# Patient Record
Sex: FEMALE | Race: WHITE | URBAN - METROPOLITAN AREA
[De-identification: names, ages, dates, MRNs, and addresses within clinical notes are randomized per-mention and may not be internally consistent; named-entity substitution may affect disease eponyms.]

---

## 2017-03-06 ENCOUNTER — TRANSFERRED RECORDS (OUTPATIENT)
Dept: HEALTH INFORMATION MANAGEMENT | Facility: CLINIC | Age: 52
End: 2017-03-06

## 2017-06-03 ENCOUNTER — NURSE TRIAGE (OUTPATIENT)
Dept: NURSING | Facility: CLINIC | Age: 52
End: 2017-06-03

## 2017-06-03 ENCOUNTER — TELEPHONE (OUTPATIENT)
Dept: DERMATOLOGY | Facility: CLINIC | Age: 52
End: 2017-06-03

## 2017-06-03 DIAGNOSIS — L40.9 PSORIASIS: Primary | ICD-10-CM

## 2017-06-03 RX ORDER — DESONIDE 0.5 MG/G
OINTMENT TOPICAL
Qty: 15 G | Refills: 0 | Status: SHIPPED | OUTPATIENT
Start: 2017-06-03 | End: 2017-06-03

## 2017-06-03 RX ORDER — DESONIDE 0.5 MG/G
OINTMENT TOPICAL
Qty: 15 G | Refills: 0 | Status: SHIPPED | OUTPATIENT
Start: 2017-06-03 | End: 2018-04-03

## 2017-06-03 NOTE — TELEPHONE ENCOUNTER
Regarding: Med refill.  ----- Message from Raghu Madden sent at 6/3/2017 11:39 AM CDT -----  Reason for Call:  Medication or medication refill:    Do you use a [facility]?  Name of the pharmacy and phone number for the current request:     Name of the medication requested: Unspecified.    Other request:     Can we leave a detailed message on this number? YES    Phone number patient can be reached at: Home number on file [phone] (home)    Best Time: Any time    Call taken on 6/3/2017 at 11:38 AM by Raghu Madden

## 2017-06-03 NOTE — TELEPHONE ENCOUNTER
"Patient calling regarding medication request. States when she was last in to see Dr. Latham, she received hydrocortisone cream for psoriasis flare ups of eyelids.  Patient states this is not what she has had in the past from previous Dr. Patient does not know name of previous medication. Has tried the RX for hydrocortisone cream  For several days and no relief. Paged Dr. Nasreen Zavala for [facility] through page  at 12:15pm directly to patient at [phone].  Mirella Gooden RN   [facility]    Reason for Disposition    Caller has URGENT medication question about med that PCP prescribed and triager unable to answer question    Additional Information    Negative: Drug overdose and nurse unable to answer question    Negative: Caller requesting information not related to medicine    Negative: Caller requesting a prescription for Strep throat and has a positive culture result    Negative: Rash while taking a medication or within 3 days of stopping it    Negative: Immunization reaction suspected    Negative: [1] Asthma and [2] having symptoms of asthma (cough, wheezing, etc)    Negative: MORE THAN A DOUBLE DOSE of a prescription or over-the-counter (OTC) drug    Negative: [1] DOUBLE DOSE (an extra dose or lesser amount) of over-the-counter (OTC) drug AND [2] any symptoms (e.g., dizziness, nausea, pain, sleepiness)    Negative: [1] DOUBLE DOSE (an extra dose or lesser amount) of prescription drug AND [2] any symptoms (e.g., dizziness, nausea, pain, sleepiness)    Negative: Took another person's prescription drug    Negative: [1] DOUBLE DOSE (an extra dose or lesser amount) of prescription drug AND [2] NO symptoms (Exception: a double dose of antibiotics)    Negative: Diabetes drug error or overdose (e.g., insulin or extra dose)    Negative: [1] Request for URGENT new prescription or refill of \"essential\" medication (i.e., likelihood of harm to patient if not taken) AND [2] triager unable to fill " per unit policy    Negative: [1] Prescription not at pharmacy AND [2] was prescribed today by PCP    Negative: Pharmacy calling with prescription questions and triager unable to answer question    Protocols used: MEDICATION QUESTION CALL-ADULT-

## 2017-06-03 NOTE — TELEPHONE ENCOUNTER
Oncall page that eyelids have been bad with psoriasis since Thursday (x 3 days).  Has been using HC 2.5% cream from Dr. Latham, however thinks that the cream is irritating and keeps rubbing eyes due to itch.  Chart review: saw Dr. Latham on 12/2016 for psoriasis, predominantly scalp. Has also seen Dr. Booth in past, had +TRUE test for ACD.  I will send in desonide ointment that she can use BID x 2 weeks on eyelids, provided 15g tube no refills.  Should see Dr. Latham in clinic if unresolved.  She understood plan.     Nasreen Zavala MD  Dermatology Resident, PGY4  [phone]

## 2017-10-29 ENCOUNTER — HEALTH MAINTENANCE LETTER (OUTPATIENT)
Age: 52
End: 2017-10-29

## 2018-04-03 ENCOUNTER — OFFICE VISIT (OUTPATIENT)
Dept: DERMATOLOGY | Facility: CLINIC | Age: 53
End: 2018-04-03
Payer: COMMERCIAL

## 2018-04-03 DIAGNOSIS — M25.50 ARTHRALGIA, UNSPECIFIED JOINT: Primary | ICD-10-CM

## 2018-04-03 DIAGNOSIS — L40.9 SCALP PSORIASIS: ICD-10-CM

## 2018-04-03 DIAGNOSIS — L72.0 MILIA: ICD-10-CM

## 2018-04-03 DIAGNOSIS — L81.4 SOLAR LENTIGINOSIS: ICD-10-CM

## 2018-04-03 DIAGNOSIS — L40.9 PSORIASIS: ICD-10-CM

## 2018-04-03 PROCEDURE — 99213 OFFICE O/P EST LOW 20 MIN: CPT | Mod: 25 | Performed by: DERMATOLOGY

## 2018-04-03 PROCEDURE — 11901 INJECT SKIN LESIONS >7: CPT | Performed by: DERMATOLOGY

## 2018-04-03 RX ORDER — KETOCONAZOLE 20 MG/ML
SHAMPOO TOPICAL
Qty: 100 ML | Refills: 5 | Status: SHIPPED | OUTPATIENT
Start: 2018-04-03 | End: 2019-04-22

## 2018-04-03 RX ORDER — HYDROCORTISONE 2.5 %
CREAM (GRAM) TOPICAL
Qty: 60 G | Refills: 1 | Status: SHIPPED | OUTPATIENT
Start: 2018-04-03

## 2018-04-03 RX ORDER — CLOBETASOL PROPIONATE 0.5 MG/ML
SOLUTION TOPICAL
Qty: 150 ML | Refills: 2 | Status: SHIPPED | OUTPATIENT
Start: 2018-04-03

## 2018-04-03 NOTE — LETTER
4/3/2018         RE: Patricia Hudson  [address] [address]        Dear Colleague,    Thank you for referring your patient, Patricia Hudson, to the [facility]. Please see a copy of my visit note below.    [facility] Dermatology Note      Dermatology Problem List:  1. Psoriasis, scalp  -Current Tx: Clobetasol solution (initiated 7/29/2013), ketoconazole shampoo, desonide for eyebrows  -Previous Tx: Clobex spray (initiated 7/29/2013), Excimer laser, last treatment 7/9/2015, Enbrel X1 month  -s/p ILK  2. Acne vulgaris  -tretinoin 0.05% cream (initiated 8/19/2015)  -Previous Tx: BP wash, Epiduo  3. History of contact dermatitis  -Patch testing with [facility]  --Balsam Peru (Myroxylon Pereirae) (1+)  --2-Bromo-2-nitropropane-1,3-diol (Bronopol) (1)  --Propolis (1+)  --Benzyl salicylate (1)    Encounter Date: Apr 3, 2018    CC:  Chief Complaint   Patient presents with     Skin Check     Hx of Psoriasis - Scalp was bad in the winter but is getting bett - Needs refill of Ketoconazole         History of Present Illness:  Ms. Patricia Hudson is a 52 year old female who presents as a follow up for psoriasis of the scalp. The patient was  last seen 12/2016 when they underwent ILK and was continued on topicals. The patient is using the clobetasol and the hydrocortisone The patient feels as though. This is keeping her disease stable. The pt reports her eyelids are doing well. She has occasional spots on the face that bother her.     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 The patient has 1-2 alcoholic drinks per week, does not smoke or use tobacco.    Family History:  There is a family history of SCC and BCC.  There is a family history of psoriasis.     Medications:  Current Outpatient Prescriptions   Medication Sig Dispense Refill     desonide (DESOWEN) 0.05 % ointment Use on the eyelids twice a day for psoriasis for a maximum of 2 weeks 15 g 0     ZOLMitriptan (ZOMIG) 5 MG tablet   3     nortriptyline (PAMELOR) 10 MG capsule        clobetasol (TEMOVATE) 0.05 % external solution Use once  daily for up to 2 weeks in a row on the scalp for flare 150 mL 2     hydrocortisone 2.5 % cream For eyelids, upply two times daily for up to 1 week in a row for flares. Take breaks from use 60 g 1     ketoconazole (NIZORAL) 2 % shampoo Use every other day in the shower for scalp 100 mL 5     amitriptyline (ELAVIL) 10 MG tablet Take 10 mg by mouth 2 times daily At bedtime       sertraline (ZOLOFT) 100 MG tablet daily       SUMAtriptan (IMITREX) 100 MG tablet Take 1 tablet (100 mg) by mouth at onset of headache 9 tablet 3       No Known Allergies    Review of Systems:  -Rheum: She has joint pain and sciatic nerve pain.Unsure if still for 45 minutes at a time or not. Has had several sport injuries and wants to see rheum  -Skin: As per HPI   -Constitutional: The patient is feeling generally well.     Physical exam:  Vitals: There were no vitals taken for this visit.  GEN: This is a well developed, well-nourished female in no acute distress, in a pleasant mood.    SKIN:  Total skin excluding the undergarment areas was performed. The exam included the head/face, neck, both arms, chest, back, abdomen, both legs, digits and/or nails. Inclduing exam of the buttocks   -Scattered brown macules on sun exposed areas.  -macular erythema, occipital scalp  -Onycholysis of the  nails.   -Macular erythema on the crown pf the scalp  -Ears and cheeks are normal   -No other lesions of concern on areas examined.       Impression/Plan:  1. Psoriasis, scalp. S/p Hx of Excimer laser and ILK. Well controlled with topicals      Continue clobetasol 0.05% solution. Apply once daily for up to two weeks in a row as needed for flare.     Continue ketoconazole 2% shampoo. Apply every other day in the shower to the scalp.     For eyelids, continue hydrocortisone 2.5% cream. Apply two times daily for up to 1 week in a row for flares. Instructed to take breaks from use.     Referral for rheum placed as patient has had joint pain    2. Onycholysis 2/2 psoriasis, she would like local treatment  Kenalog intralesional injection procedure note: After verbal consent and discussion of risks including but not limited to atrophy, pain, and bruising, time out was performed, the patient underwent positioning and the area was prepped with isopropyl alcohol, .7 total cc of Kenalog 5 mg/cc was injected into 10 sites on the nails.  The patient tolerated the procedure well and left the Dermatology clinic in good condition.        3. Millia    Start Differin cream for the face once daily.        Follow up in6 weeks, earlier for new or changing lesions.     Staff Involved:  Scribe/Staff     Scribe Disclosure:   I, Holli Silverman, am serving as a scribe to document services personally performed by Dr. Rosalee Latham, based on data collection and the provider's statements to me.       Provider Disclosure:   The documentation recorded by the scribe accurately reflects the services I personally performed and the decisions made by me.    Rosalee Latham MD    Department of Dermatology  [facility]: Phone: [phone], Fax:[phone]  [facility]: Phone: [phone], Fax: [phone]        Again, thank you  for allowing me to participate in the care of your patient.        Sincerely,        Rosalee Latham MD

## 2018-04-03 NOTE — PATIENT INSTRUCTIONS
Preventive Care:    Breast Cancer Screening: During our visit today, we discussed that it is recommended you receive breast cancer screening. Please call or make an appointment with your primary care provider to discuss this with them. You may also call the [facility] scheduling line ([phone]) to set up a mammography appointment at the Breast Center within the [facility].    Over the counter Differin for millia, may use once daily     See rheumatology for evaluation of psoriatic arthritis, referral placed

## 2018-04-03 NOTE — Clinical Note
Please abstract the following data from this visit with this patient into the appropriate field in Epic:  Colonoscopy done on this date: 3/6/17 (approximately), by this group: [facility], results were normal.

## 2018-04-03 NOTE — PROGRESS NOTES
[facility] Dermatology Note      Dermatology Problem List:  1. Psoriasis, scalp  -Current Tx: Clobetasol solution (initiated 7/29/2013), ketoconazole shampoo, desonide for eyebrows  -Previous Tx: Clobex spray (initiated 7/29/2013), Excimer laser, last treatment 7/9/2015, Enbrel X1 month  -s/p ILK  2. Acne vulgaris  -tretinoin 0.05% cream (initiated 8/19/2015)  -Previous Tx: BP wash, Epiduo  3. History of contact dermatitis  -[facility]  --Balsam Peru (Myroxylon Pereirae) (1+)  --2-Bromo-2-nitropropane-1,3-diol (Bronopol) (1)  --Propolis (1+)  --Benzyl salicylate (1)    Encounter Date: Apr 3, 2018    CC:  Chief Complaint   Patient presents with     Skin Check     Hx of Psoriasis - Scalp was bad in the winter but is getting bett - Needs refill of Ketoconazole         History of Present Illness:  Ms. Patricia Hudson is a 52 year old female who presents as a follow up for psoriasis of the scalp. The patient was  last seen 12/2016 when they underwent ILK and was continued on topicals. The patient is using the clobetasol and the hydrocortisone The patient feels as though. This is keeping her disease stable. The pt reports her eyelids are doing well. She has occasional spots on the face that bother her.     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 The patient has 1-2 alcoholic drinks per week, does not smoke or use tobacco.    Family History:  There is a  family history of SCC and BCC.  There is a family history of psoriasis.     Medications:  Current Outpatient Prescriptions   Medication Sig Dispense Refill     desonide (DESOWEN) 0.05 % ointment Use on the eyelids twice a day for psoriasis for a maximum of 2 weeks 15 g 0     ZOLMitriptan (ZOMIG) 5 MG tablet   3     nortriptyline (PAMELOR) 10 MG capsule        clobetasol (TEMOVATE) 0.05 % external solution Use once  daily for up to 2 weeks in a row on the scalp for flare 150 mL 2     hydrocortisone 2.5 % cream For eyelids, upply two times daily for up to 1 week in a row for flares. Take breaks from use 60 g 1     ketoconazole (NIZORAL) 2 % shampoo Use every other day in the shower for scalp 100 mL 5     amitriptyline (ELAVIL) 10 MG tablet Take 10 mg by mouth 2 times daily At bedtime       sertraline (ZOLOFT) 100 MG tablet daily       SUMAtriptan (IMITREX) 100 MG tablet Take 1 tablet (100 mg) by mouth at onset of headache 9 tablet 3       No Known Allergies    Review of Systems:  -Rheum: She has joint pain and sciatic nerve pain.Unsure if still for 45 minutes at a time or not. Has had several sport injuries and wants to see rheum  -Skin: As per HPI   -Constitutional: The patient is feeling generally well.     Physical exam:  Vitals: There were no vitals taken for this visit.  GEN: This is a well developed, well-nourished female in no acute distress, in a pleasant mood.    SKIN:  Total skin excluding the undergarment areas was performed. The exam included the head/face, neck, both arms, chest, back, abdomen, both legs, digits and/or nails. Inclduing exam of the buttocks   -Scattered brown macules on sun exposed areas.  -macular erythema, occipital scalp  -Onycholysis of the nails.   -Macular erythema on the crown pf the scalp  -Ears and cheeks are normal   -No other lesions of concern on areas examined.       Impression/Plan:  1. Psoriasis, scalp. S/p Hx of Excimer laser and ILK. Well controlled with topicals       Continue clobetasol 0.05% solution. Apply once daily for up to two weeks in a row as needed for flare.     Continue ketoconazole 2% shampoo. Apply every other day in the shower to the scalp.     For eyelids, continue hydrocortisone 2.5% cream. Apply two times daily for up to 1 week in a row for flares. Instructed to take breaks from use.     Referral for rheum placed as patient has had joint pain    2. Onycholysis 2/2 psoriasis, she would like local treatment  Kenalog intralesional injection procedure note: After verbal consent and discussion of risks including but not limited to atrophy, pain, and bruising, time out was performed, the patient underwent positioning and the area was prepped with isopropyl alcohol, .7 total cc of Kenalog 5 mg/cc was injected into 10 sites on the nails.  The patient tolerated the procedure well and left the Dermatology clinic in good condition.        3. Millia    Start Differin cream for the face once daily.        Follow up in6 weeks, earlier for new or changing lesions.     Staff Involved:  Scribe/Staff     Scribe Disclosure:   I, Holli Silverman, am serving as a scribe to document services personally performed by Dr. Rosalee Latham, based on data collection and the provider's statements to me.       Provider Disclosure:   The documentation recorded by the scribe accurately reflects the services I personally performed and the decisions made by me.    Rosalee Latham MD    Department of Dermatology  [facility]: Phone: [phone], Fax:[phone]  [facility]: Phone: [phone], Fax: [phone]

## 2018-04-03 NOTE — NURSING NOTE
Dermatology Rooming Note    Patricia Hudson's goals for this visit include:   Chief Complaint   Patient presents with     Skin Check     Hx of Psoriasis - Scalp was bad in the winter but is getting bett - Needs refill of Ketoconazole       Is a scribe okay for this visit: YES    Are records needed for this visit(If yes, obtain release of information): NO     Vitals: There were no vitals taken for this visit.    Referring Provider:  Referred Self, MD  No address on file      Debra Mathis CMA

## 2018-04-10 ENCOUNTER — TELEPHONE (OUTPATIENT)
Dept: DERMATOLOGY | Facility: CLINIC | Age: 53
End: 2018-04-10

## 2018-04-10 NOTE — TELEPHONE ENCOUNTER
Health Call Center    Phone Message    May a detailed message be left on voicemail: yes    Reason for Call: Patient called wanting to know the over the counter product Dr Latham recommended at her last visit. She stated it would be given to her and per patient she was never given the info. Requesting a call back today with that information. Thank you    Action Taken: Message routed to:  Adult Clinics: Dermatology p [id number]

## 2018-04-10 NOTE — TELEPHONE ENCOUNTER
Voicemail message left notifying patient that it is recommended she use OTC Differin once daily.    Anita Mcintyre RN

## 2018-05-22 ENCOUNTER — OFFICE VISIT (OUTPATIENT)
Dept: DERMATOLOGY | Facility: CLINIC | Age: 53
End: 2018-05-22
Payer: COMMERCIAL

## 2018-05-22 DIAGNOSIS — L40.9 PSORIASIS OF NAIL: Primary | ICD-10-CM

## 2018-05-22 PROCEDURE — 99212 OFFICE O/P EST SF 10 MIN: CPT | Mod: 25 | Performed by: DERMATOLOGY

## 2018-05-22 PROCEDURE — 11900 INJECT SKIN LESIONS </W 7: CPT | Performed by: DERMATOLOGY

## 2018-05-22 ASSESSMENT — PAIN SCALES - GENERAL: PAINLEVEL: SEVERE PAIN (6)

## 2018-05-22 NOTE — MR AVS SNAPSHOT
After Visit Summary   5/22/2018    Patricia Hudson    MRN: [id number]           Patient Information     Date Of Birth          1965        Visit Information        Provider Department      5/22/2018 4:15 PM Rosalee Latham MD [facility]        Today's Diagnoses     Psoriasis of nail    -  1      Care Instructions    Preventive Care:    Breast Cancer Screening: During our visit today, we discussed that it is recommended you receive breast cancer screening. Please call or make an appointment with your primary care provider to discuss this with them. You may also call the [facility] scheduling line ([phone]) to set up a mammography appointment at the Breast Center within the [facility].              Follow-ups after your visit        Follow-up notes from your care team     Return for 2-3 months nail psoriasis.      Your next 10 appointments already scheduled     Aug 07, 2018  3:45 PM CDT   Return Visit with Rosalee Latham MD   [facility]    [address] [place] [id number] [phone]            Jan 03, 2019  7:45 AM CST   Return Visit with Rosalee Latham MD   [facility] ([facility]    [address] [place] [id number] [phone]              Who to contact     If you have questions or need follow up information about today's clinic visit or your schedule please contact [facility] directly at [phone].  Normal or non-critical lab and imaging results will be communicated to you by MyChart, letter or phone within 4 business days after the clinic has received the results. If you do not hear from us within 7 days, please contact the clinic through MyChart or phone. If you have a critical or abnormal lab result, we will notify you by phone as soon as possible.  Submit refill requests through [employer] or call your pharmacy and  they will forward the refill request to us. Please allow 3 business days for your refill to be completed.          Additional Information About Your Visit        [employer] Information     [employer] gives you secure access to your electronic health record. If you see a primary care provider, you can also send messages to your care team and make appointments. If you have questions, please call your primary care clinic.  If you do not have a primary care provider, please call [phone] and they will assist you.      [employer] is an electronic gateway that provides easy, online access to your medical records. With [employer], you can request a clinic appointment, read your test results, renew a prescription or communicate with your care team.     To access your existing account, please contact your [facility] or call [phone] for assistance.        Care EveryWhere ID     This is your Care EveryWhere ID. This could be used by other organizations to access your [place] medical records  [id number]         Blood Pressure from Last 3 Encounters:   08/17/16 126/86   11/16/10 114/73    Weight from Last 3 Encounters:   08/17/16 104.3 kg (230 lb)   11/16/10 96.2 kg (212 lb)              We Performed the Following     INJECTION INTO SKIN LESIONS <=7        Primary Care Provider Office Phone # Fax #    Laurel De Paz [phone]       [address] [address]        Equal Access to Services     CANDIE HUSSEIN AH: Hadii aad ku hadasho Soomaali, waaxda luqadaha, qaybta kaalmada thomasyada, sandra mathews . So [facility] [phone].    ATENCIÓN: Si habla español, tiene a overton disposición servicios gratuitos de asistencia lingüística. Idris al [phone].    We comply with applicable federal civil rights laws and [place] laws. We do not discriminate on the basis of race, color, national origin, age, disability, sex, sexual orientation, or  gender identity.            Thank you!     Thank you for choosing [facility]  for your care. Our goal is always to provide you with excellent care. Hearing back from our patients is one way we can continue to improve our services. Please take a few minutes to complete the written survey that you may receive in the mail after your visit with us. Thank you!             Your Updated Medication List - Protect others around you: Learn how to safely use, store and throw away your medicines at www.disposemymeds.org.          This list is accurate as of 5/22/18  4:36 PM.  Always use your most recent med list.                   Brand Name Dispense Instructions for use Diagnosis    amitriptyline 10 MG tablet    ELAVIL     Take 10 mg by mouth 3 times daily At bedtime        clobetasol 0.05 % external solution    TEMOVATE    150 mL    Use once  daily for up to 2 weeks in a row on the scalp for flare    Scalp psoriasis       hydrocortisone 2.5 % cream     60 g    For eyelids, upply two times daily for up to 1 week in a row for flares. Take breaks from use    Psoriasis       ketoconazole 2 % shampoo    NIZORAL    100 mL    Use every other day in the shower for scalp    Scalp psoriasis       nortriptyline 10 MG capsule    PAMELOR          sertraline 100 MG tablet    ZOLOFT     daily        SUMAtriptan 100 MG tablet    IMITREX    9 tablet    Take 1 tablet (100 mg) by mouth at onset of headache    Migraine without aura and without status migrainosus, not intractable       ZOLMitriptan 5 MG tablet    ZOMIG

## 2018-05-22 NOTE — PATIENT INSTRUCTIONS
Preventive Care:    Breast Cancer Screening: During our visit today, we discussed that it is recommended you receive breast cancer screening. Please call or make an appointment with your primary care provider to discuss this with them. You may also call the [facility] scheduling line ([phone]) to set up a mammography appointment at the Breast Center within the [facility].

## 2018-05-22 NOTE — PROGRESS NOTES
[facility] Dermatology Note      Dermatology Problem List:  1. Psoriasis, scalp  -Current Tx: Clobetasol solution (initiated 7/29/2013), ketoconazole shampoo, desonide for eyebrows  -Previous Tx: Clobex spray (initiated 7/29/2013), Excimer laser, last treatment 7/9/2015, Enbrel X1 month  -s/p ILK  2. Acne vulgaris  -tretinoin 0.05% cream (initiated 8/19/2015)  -Previous Tx: BP wash, Epiduo  3. History of contact dermatitis  -Patch testing with [facility]  -Balsam Peru (Myroxylon Pereirae) (1+)  -2-Bromo-2-nitropropane-1,3-diol (Bronopol) (1)  -Propolis (1+)  -Benzyl salicylate (1)    Encounter Date: May 22, 2018    CC:  Chief Complaint   Patient presents with     Follow Up For     Onycholysis psoriasis ILK injections- saw improvement until got gel nails     Psoriasis     scalp and eyelids         History of Present Illness:  Ms. Patricia Hudson is a 52 year old female who presents as a follow up for psoriasis of the scalp. The patient was  last seen 4/3/2018. The patient reports that she has had improvement until getting gel nail polish. The patient reports that she is has psoriasis on the scalp and eyelids. The patient reports that she is is using the differin for the face and the ketoconazole and other topical regimen for psoriasis.         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 The patient has 1-2 alcoholic drinks per week, does not smoke or use tobacco.  Kept in  chart for convenience.       Family History:  There is a family history of SCC and BCC.  There is a family history of psoriasis.   Kept in chart for convenience.       Medications:  Current Outpatient Prescriptions   Medication Sig Dispense Refill     amitriptyline (ELAVIL) 10 MG tablet Take 10 mg by mouth 3 times daily At bedtime       clobetasol (TEMOVATE) 0.05 % external solution Use once  daily for up to 2 weeks in a row on the scalp for flare 150 mL 2     hydrocortisone 2.5 % cream For eyelids, upply two times daily for up to 1 week in a row for flares. Take breaks from use 60 g 1     ketoconazole (NIZORAL) 2 % shampoo Use every other day in the shower for scalp 100 mL 5     nortriptyline (PAMELOR) 10 MG capsule        sertraline (ZOLOFT) 100 MG tablet daily       SUMAtriptan (IMITREX) 100 MG tablet Take 1 tablet (100 mg) by mouth at onset of headache 9 tablet 3     ZOLMitriptan (ZOMIG) 5 MG tablet   3       No Known Allergies    Review of Systems:  -Constitutional: The patient is feeling generally well.   -Msk: Patient has patellar tendonitis and is awaiting knee replacement surgery.     Physical exam:  Vitals: There were no vitals taken for this visit.  GEN: This is a well developed, well-nourished female in no acute distress, in a pleasant mood.    SKIN:  Focused examination of the nails  was performed.      -Right 4th and 5th digit there is 4 mm clearing of the nails.   -No other lesions of concern on areas examined.       Impression/Plan:  1. Psoriasis, scalp. S/p Hx of Excimer laser and ILK. Well controlled with topicals-not addressed today      Continue clobetasol 0.05% solution. Apply once daily for up to two weeks in a row as needed for flare.     Continue ketoconazole 2% shampoo. Apply every other day in the shower to the scalp.     For eyelids, continue hydrocortisone 2.5% cream. Apply two times daily for up to 1 week in a row for flares. Instructed to take breaks from use.     2. Onycholysis 2/2  psoriasis, she would like local treatment, much improved in the setting of psoriasis  Kenalog intralesional injection procedure note: After verbal consent and discussion of risks including but not limited to atrophy, pain, and bruising, time out was performed, the patient underwent positioning and the area was prepped with isopropyl alcohol, .2 total cc of Kenalog 5 mg/cc was injected into nails.  The patient tolerated the procedure well and left the Dermatology clinic in good condition.      3. Millia- the patient just started    Continue Differin cream for the face once daily.        Follow up in 1 year, earlier for new or changing lesions.     Staff Involved:  Scribe/Staff     Scribe Disclosure:   I, Holli Silverman, am serving as a scribe to document services personally performed by Dr. Rosalee Latham, based on data collection and the provider's statements to me.       Provider Disclosure:   The documentation recorded by the scribe accurately reflects the services I personally performed and the decisions made by me.    Rosalee Latham MD    Department of Dermatology  [facility] [place]: Phone: [phone], Fax:[phone]  [facility]: Phone: [phone], Fax: [phone]

## 2018-05-22 NOTE — NURSING NOTE
Dermatology Rooming Note    Patricia Hudson's goals for this visit include:   Chief Complaint   Patient presents with     Follow Up For     Onycholysis psoriasis ILK injections- saw improvement until got gel nails       Is a scribe okay for this visit:YES    Are records needed for this visit(If yes, obtain release of information): No     Vitals: There were no vitals taken for this visit.    Referring Provider:  No referring provider defined for this encounter.    Ramya Laguerre LPN

## 2018-05-22 NOTE — LETTER
5/22/2018         RE: Patricia Hudson  [address] [address]        Dear Colleague,    Thank you for referring your patient, Patricia Hudson, to the [facility]. Please see a copy of my visit note below.    [facility] Dermatology Note      Dermatology Problem List:  1. Psoriasis, scalp  -Current Tx: Clobetasol solution (initiated 7/29/2013), ketoconazole shampoo, desonide for eyebrows  -Previous Tx: Clobex spray (initiated 7/29/2013), Excimer laser, last treatment 7/9/2015, Enbrel X1 month  -s/p ILK  2. Acne vulgaris  -tretinoin 0.05% cream (initiated 8/19/2015)  -Previous Tx: BP wash, Epiduo  3. History of contact dermatitis  -Patch testing with [facility]  -Balsam [place] (Myroxylon Pereirae) (1+)  -2-Bromo-2-nitropropane-1,3-diol (Bronopol) (1)  -Propolis (1+)  -Benzyl salicylate (1)    Encounter Date: May 22, 2018    CC:  Chief Complaint   Patient presents with     Follow Up For     Onycholysis psoriasis ILK injections- saw improvement until got gel nails     Psoriasis     scalp and eyelids         History of Present Illness:  Ms. Patricia Hudson is a 52 year old female who presents as a follow up for psoriasis of the scalp. The patient was  last seen 4/3/2018. The patient reports that she has had improvement until getting gel nail polish. The patient reports that she is has psoriasis on the scalp and eyelids. The patient reports that she is is using the differin for the face and the ketoconazole and other topical regimen for psoriasis.         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 The patient has 1-2 alcoholic drinks per week, does not smoke or use tobacco.  Kept in chart for convenience.       Family History:  There is a family history of SCC and BCC.  There is a family history of psoriasis.   Kept in chart for convenience.       Medications:  Current Outpatient Prescriptions   Medication Sig Dispense Refill     amitriptyline (ELAVIL) 10 MG tablet Take 10 mg by mouth 3 times daily At bedtime       clobetasol (TEMOVATE) 0.05 % external solution Use once  daily for up to 2 weeks in a row on the scalp for flare 150 mL 2     hydrocortisone 2.5 % cream For eyelids, upply two times daily for up to 1 week in a row for flares. Take breaks from use 60 g 1     ketoconazole (NIZORAL) 2 % shampoo Use every other day in the shower for scalp 100 mL 5     nortriptyline (PAMELOR) 10 MG capsule        sertraline (ZOLOFT) 100 MG tablet daily       SUMAtriptan (IMITREX) 100 MG tablet Take 1 tablet (100 mg) by mouth at onset of headache 9 tablet 3     ZOLMitriptan (ZOMIG) 5 MG tablet   3       No Known Allergies    Review of Systems:  -Constitutional: The patient is feeling generally well.   -Msk: Patient has patellar tendonitis and is awaiting knee replacement surgery.     Physical exam:  Vitals: There were no vitals taken for this visit.  GEN: This is a well developed, well-nourished female in no acute distress, in a pleasant mood.    SKIN:  Focused examination of the nails  was performed.      -Right 4th and 5th digit there is 4 mm clearing of the nails.   -No other lesions of concern on areas examined.       Impression/Plan:  1. Psoriasis, scalp. S/p Hx of Excimer laser and ILK. Well controlled with topicals-not addressed today      Continue clobetasol 0.05% solution. Apply once daily for up to two weeks in a row as needed for flare.     Continue  ketoconazole 2% shampoo. Apply every other day in the shower to the scalp.     For eyelids, continue hydrocortisone 2.5% cream. Apply two times daily for up to 1 week in a row for flares. Instructed to take breaks from use.     2. Onycholysis 2/2 psoriasis, she would like local treatment, much improved in the setting of psoriasis  Kenalog intralesional injection procedure note: After verbal consent and discussion of risks including but not limited to atrophy, pain, and bruising, time out was performed, the patient underwent positioning and the area was prepped with isopropyl alcohol, .2 total cc of Kenalog 5 mg/cc was injected into nails.  The patient tolerated the procedure well and left the Dermatology clinic in good condition.      3. Millia- the patient just started    Continue Differin cream for the face once daily.        Follow up in 1 year, earlier for new or changing lesions.     Staff Involved:  Scribe/Staff     Scribe Disclosure:   I, Holli Silverman, am serving as a scribe to document services personally performed by Dr. Rosalee Latham, based on data collection and the provider's statements to me.       Provider Disclosure:   The documentation recorded by the scribe accurately reflects the services I personally performed and the decisions made by me.    Rosalee Latham MD    Department of Dermatology  [facility]: Phone: [phone], Fax:[phone]  [facility]: Phone: [phone], Fax: [phone]        Again, thank you for allowing me to participate in the care of your patient.        Sincerely,        Rosalee Latham MD

## 2019-04-10 DIAGNOSIS — L40.9 SCALP PSORIASIS: ICD-10-CM

## 2019-04-10 NOTE — TELEPHONE ENCOUNTER
Pt is overdue for office visit. Refill request received via fax is from [facility] in [place], [place]. Pt called to schedule an appt. No answer.  does not identify pt. Left general message for pt to call the [facility] clinic back at [phone].....Katelyn Kirkpatrick RN      Ketoconazole 2% shampoo         Last written prescription date: 4/3/18        Last fill quantity: 100ml, # refills: 5        Last office visit: 5/22/18        Future office visit: none                 Routing refill request to provider for review/approval because: Drug not on the [facility], [facility] or [facility] refill protocol or controlled substance

## 2019-04-15 NOTE — TELEPHONE ENCOUNTER
Pt called, No answer.  does not identify pt. Left general message for pt to call the [facility] clinic back at [phone]....Katelyn Kirkpatrick RN

## 2019-04-22 RX ORDER — KETOCONAZOLE 20 MG/ML
SHAMPOO TOPICAL
Qty: 100 ML | Refills: 5 | Status: SHIPPED | OUTPATIENT
Start: 2019-04-22

## 2019-04-22 NOTE — TELEPHONE ENCOUNTER
I left a message for patient to call [facility].  Three attempts to reach patient.  Forwarding to MD to review and advise on refill.    Per 5/22/18 office visit with Dr. Latham:  Impression/Plan:  1. Psoriasis, scalp. S/p Hx of Excimer laser and ILK. Well controlled with topicals-not addressed today    ? Continue clobetasol 0.05% solution. Apply once daily for up to two weeks in a row as needed for flare.   ? Continue ketoconazole 2% shampoo. Apply every other day in the shower to the scalp.   ? For eyelids, continue hydrocortisone 2.5% cream. Apply two times daily for up to 1 week in a row for flares. Instructed to take breaks from use.      2. Onycholysis 2/2 psoriasis, she would like local treatment, much improved in the setting of psoriasis    Kenalog intralesional injection procedure note: After verbal consent and discussion of risks including but not limited to atrophy, pain, and bruising, time out was performed, the patient underwent positioning and the area was prepped with isopropyl alcohol, .2 total cc of Kenalog 5 mg/cc was injected into nails.  The patient tolerated the procedure well and left the Dermatology clinic in good condition.       3. Millia- the patient just started    Continue Differin cream for the face once daily.          Follow up in 1 year, earlier for new or changing lesions.        Anita Mcintyre RN

## 2019-06-13 ENCOUNTER — TELEPHONE (OUTPATIENT)
Dept: FAMILY MEDICINE | Facility: CLINIC | Age: 54
End: 2019-06-13

## 2019-06-13 NOTE — TELEPHONE ENCOUNTER
Panel Management Review   One phone call and send letter if unable to reach them or [employer] message and send letter if not read after 2 weeks (You will get a message to your inbasket)      Visit date not found    Health Maintenance Due   Topic Date Due     PREVENTIVE CARE VISIT  1965     MAMMO SCREENING  1965     PAP  1965     MIGRAINE ACTION PLAN  1965     HIV SCREENING  10/08/1982     DTAP/TDAP/TD IMMUNIZATION (1 - Tdap) 10/08/1992     LIPID  10/08/2012     ZOSTER IMMUNIZATION (1 of 2) 10/08/2017     PHQ-2  01/01/2019        Fail List measure: BMI        Patient is due/failing the following:   BMI    Action needed:   Patient needs office visit for Preventative Visit.    Type of outreach:    Phone, left message for patient to call back.     Questions for provider review:    None                                                                                                                        Elida Valladares

## 2020-03-01 ENCOUNTER — HEALTH MAINTENANCE LETTER (OUTPATIENT)
Age: 55
End: 2020-03-01

## 2020-12-14 ENCOUNTER — HEALTH MAINTENANCE LETTER (OUTPATIENT)
Age: 55
End: 2020-12-14

## 2021-04-17 ENCOUNTER — HEALTH MAINTENANCE LETTER (OUTPATIENT)
Age: 56
End: 2021-04-17

## 2021-10-02 ENCOUNTER — HEALTH MAINTENANCE LETTER (OUTPATIENT)
Age: 56
End: 2021-10-02

## 2022-05-14 ENCOUNTER — HEALTH MAINTENANCE LETTER (OUTPATIENT)
Age: 57
End: 2022-05-14

## 2023-01-14 ENCOUNTER — HEALTH MAINTENANCE LETTER (OUTPATIENT)
Age: 58
End: 2023-01-14

## 2023-06-02 ENCOUNTER — HEALTH MAINTENANCE LETTER (OUTPATIENT)
Age: 58
End: 2023-06-02